# Patient Record
Sex: MALE | Race: WHITE | NOT HISPANIC OR LATINO | URBAN - METROPOLITAN AREA
[De-identification: names, ages, dates, MRNs, and addresses within clinical notes are randomized per-mention and may not be internally consistent; named-entity substitution may affect disease eponyms.]

---

## 2020-01-16 ENCOUNTER — EMERGENCY (EMERGENCY)
Facility: HOSPITAL | Age: 40
LOS: 1 days | Discharge: HOME | End: 2020-01-16
Attending: EMERGENCY MEDICINE | Admitting: EMERGENCY MEDICINE
Payer: OTHER MISCELLANEOUS

## 2020-01-16 VITALS
OXYGEN SATURATION: 98 % | DIASTOLIC BLOOD PRESSURE: 79 MMHG | HEART RATE: 69 BPM | RESPIRATION RATE: 16 BRPM | SYSTOLIC BLOOD PRESSURE: 129 MMHG | HEIGHT: 73 IN | TEMPERATURE: 98 F | WEIGHT: 214.95 LBS

## 2020-01-16 PROCEDURE — 73630 X-RAY EXAM OF FOOT: CPT | Mod: 26,LT

## 2020-01-16 PROCEDURE — 99283 EMERGENCY DEPT VISIT LOW MDM: CPT | Mod: 25

## 2020-01-16 PROCEDURE — 99053 MED SERV 10PM-8AM 24 HR FAC: CPT

## 2020-01-16 PROCEDURE — 73610 X-RAY EXAM OF ANKLE: CPT | Mod: 26,LT

## 2020-01-16 RX ORDER — IBUPROFEN 200 MG
600 TABLET ORAL ONCE
Refills: 0 | Status: COMPLETED | OUTPATIENT
Start: 2020-01-16 | End: 2020-01-16

## 2020-01-16 RX ADMIN — Medication 600 MILLIGRAM(S): at 23:47

## 2020-01-16 NOTE — ED PROVIDER NOTE - DIAGNOSTIC INTERPRETATION
ER Physician: Harry Delgadillo  ANKLE XRAY INTERPRETATION:  no acute fracture; no soft tissue swelling noted; normal bony alignment.   FOOT XRAY INTERPRETATION:  no acute fracture; no soft tissue swelling noted; normal bony alignment.

## 2020-01-16 NOTE — ED PROVIDER NOTE - PHYSICAL EXAMINATION
Physical Exam  GEN: Awake, alert, non-toxic appearing, NCAT  EYES: full EOMI,  ENT: External inspection normal, normal voice,   HEAD: atraumatic  NECK: FROM neck, supple, no meningismus, trachea midline, no JVD  RESP: no tachypnea, no hypoxia, no resp distress,  MSK: tenderness to L lat mal > med mal, tenderness over 4/5th metatarsal and 1st metatarsal, no tenderness to proximal tibfib, full ROM of L knee/ankle  SKIN: pedal pulse intact, cap refill < 2 sec Physical Exam  GEN: Awake, alert, non-toxic appearing, NCAT  EYES: full EOMI,  ENT: External inspection normal, normal voice,   HEAD: atraumatic  NECK: FROM neck, supple,   RESP: no tachypnea, no hypoxia, no resp distress,  MSK: tenderness to L lat mal > med mal, tenderness over 4/5th metatarsal and 1st metatarsal, no tenderness to proximal tibfib, full ROM of L knee/ankle  SKIN: pedal pulse intact, cap refill < 2 sec

## 2020-01-16 NOTE — ED PROVIDER NOTE - OBJECTIVE STATEMENT
40 yom pw ankle/foot pain, stepped into a pothole after getting off from his truck while at work.  pain to ankle/foot.  no other injuries.  worse w/ weight bearing 40 yom pw ankle/foot pain, stepped into a pothole after getting off from his truck while at work.  pain to ankle/foot.  no other injuries.  worse w/ weight bearing.  arrived via ambulance from work.

## 2020-01-16 NOTE — ED PROVIDER NOTE - PATIENT PORTAL LINK FT
You can access the FollowMyHealth Patient Portal offered by [facility] by registering at the following website: [email or web address]. By joining [employer]’s FollowMyHealth portal, you will also be able to view your health information using other applications (apps) compatible with our system.

## 2020-01-16 NOTE — ED PROVIDER NOTE - NSFOLLOWUPINSTRUCTIONS_ED_ALL_ED_FT
Follow up with your primary care doctor   Alternate tylenol/motrin if you do not have any allergies/intolerance   You can take 600mg of motrin every 6 hours with food as needed  You can take 1000mg of tylenol every 6 hours as needed   Ice for swelling as needed (5-10 minutes every hour)  Keep it elevated to reduce swelling   Keep your foot in the boot for comfort  Return immediately for any new or worsening symptoms or any new concerns Follow up with your primary care doctor   You were brought here by an ambulance from work  The xray here did not show any fractures.  Sometimes a small fracture is too subtle to be seen on the initial xray.  You may need a repeat xray in 7-10 days if your pain is not improving, or sooner if pain is worsening.  Alternate tylenol/motrin if you do not have any allergies/intolerance   You can take 600mg of motrin every 6 hours with food as needed  You can take 1000mg of tylenol every 6 hours as needed   Ice for swelling as needed (5-10 minutes every hour)  Keep it elevated to reduce swelling   Keep your foot in the boot for comfort  Return immediately for any new or worsening symptoms or any new concerns

## 2020-01-17 VITALS
RESPIRATION RATE: 18 BRPM | DIASTOLIC BLOOD PRESSURE: 74 MMHG | OXYGEN SATURATION: 96 % | HEART RATE: 64 BPM | TEMPERATURE: 98 F | SYSTOLIC BLOOD PRESSURE: 135 MMHG

## 2020-01-17 NOTE — ED ADULT NURSE NOTE - OBJECTIVE STATEMENT
Pt is a 40y male complaining of left ankle pain. Pt states that he twisted his ankle while at work. Pt able to bear weight.
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
## 2020-01-22 DIAGNOSIS — M79.672 PAIN IN LEFT FOOT: ICD-10-CM
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
## 2020-01-22 DIAGNOSIS — M25.572 PAIN IN LEFT ANKLE AND JOINTS OF LEFT FOOT: ICD-10-CM

## 2024-10-02 PROBLEM — Z00.00 ENCOUNTER FOR PREVENTIVE HEALTH EXAMINATION: Status: ACTIVE | Noted: 2024-10-02

## 2024-10-02 PROBLEM — K44.9 HIATAL HERNIA: Status: ACTIVE | Noted: 2024-10-02

## 2024-10-02 PROBLEM — K22.2 SCHATZKI'S RING: Status: ACTIVE | Noted: 2024-10-02

## 2024-10-03 ENCOUNTER — APPOINTMENT (OUTPATIENT)
Dept: THORACIC SURGERY | Facility: CLINIC | Age: 44
End: 2024-10-03
Payer: COMMERCIAL

## 2024-10-03 DIAGNOSIS — K22.2 ESOPHAGEAL OBSTRUCTION: ICD-10-CM

## 2024-10-03 DIAGNOSIS — Z87.19 PERSONAL HISTORY OF OTHER DISEASES OF THE DIGESTIVE SYSTEM: ICD-10-CM

## 2024-10-03 DIAGNOSIS — K44.9 DIAPHRAGMATIC HERNIA W/OUT OBSTRUCTION OR GANGRENE: ICD-10-CM

## 2024-10-03 PROCEDURE — 99204 OFFICE O/P NEW MOD 45 MIN: CPT

## 2024-10-03 RX ORDER — OMEPRAZOLE 40 MG/1
40 CAPSULE, DELAYED RELEASE ORAL
Refills: 0 | Status: ACTIVE | COMMUNITY

## 2024-10-03 RX ORDER — FAMOTIDINE 40 MG/1
40 TABLET, FILM COATED ORAL
Refills: 0 | Status: ACTIVE | COMMUNITY

## 2024-10-03 NOTE — HISTORY OF PRESENT ILLNESS
[FreeTextEntry1] : Mr. MI CAZARES, 44 year old male, never smoker, w/ hx of chronic GERD, who presented with hiatal hernia w/ GERD, referred by Dr. Dasha Colvin (GI)  EGD on 8/7/24 by Dr. Colvin showed a small H.H., a non-obstructing Schatzki's ring in the lower third of the esophagus. Path c/w mild chronic gastritis, negative for H. Pylori.  Upper GI Series on 9/26/24 at [facility]: - ting sized sliding H.H. - normal esophageal motility  Patient is here today for CT Sx consultation. He reports of epigastric discomfort, denies any acid reflux, dysphagia or food getting stuck.

## 2024-10-03 NOTE — DATA REVIEWED
[FreeTextEntry1] : I have independently reviewed the following: EGD on 8/7/24 by Dr. Colvin Upper GI Series on 9/26/24 at [facility]

## 2024-10-03 NOTE — PHYSICAL EXAM
[Restricted in physically strenuous activity but ambulatory and able to carry out work of a light or sedentary nature] : Status 1- Restricted in physically strenuous activity but ambulatory and able to carry out work of a light or sedentary nature, e.g., light house work, office work [General Appearance - Alert] : alert [General Appearance - In No Acute Distress] : in no acute distress [Sclera] : the sclera and conjunctiva were normal [PERRL With Normal Accommodation] : pupils were equal in size, round, and reactive to light [Outer Ear] : the ears and nose were normal in appearance [Hearing Threshold Finger Rub Not [place]] : hearing was normal [Neck Appearance] : the appearance of the neck was normal [Auscultation Breath Sounds / Voice Sounds] : lungs were clear to auscultation bilaterally [Heart Rate And Rhythm] : heart rate was normal and rhythm regular [Examination Of The Chest] : the chest was normal in appearance [Chest Visual Inspection Thoracic Asymmetry] : no chest asymmetry [Diminished Respiratory Excursion] : normal chest expansion [2+] : left 2+ [Bowel Sounds] : normal bowel sounds [Abdomen Soft] : soft [Cervical Lymph Nodes Enlarged Posterior Bilaterally] : posterior cervical [Cervical Lymph Nodes Enlarged Anterior Bilaterally] : anterior cervical [No CVA Tenderness] : no ~M costovertebral angle tenderness [No Spinal Tenderness] : no spinal tenderness [Abnormal Walk] : normal gait [Nail Clubbing] : no clubbing  or cyanosis of the fingernails [Musculoskeletal - Swelling] : no joint swelling seen [Motor Tone] : muscle strength and tone were normal [Skin Color & Pigmentation] : normal skin color and pigmentation [Skin Turgor] : normal skin turgor [] : no rash [Deep Tendon Reflexes (DTR)] : deep tendon reflexes were 2+ and symmetric [Sensation] : the sensory exam was normal to light touch and pinprick [No Focal Deficits] : no focal deficits [Oriented To Time, Place, And Person] : oriented to person, place, and time [Impaired Insight] : insight and judgment were intact [Affect] : the affect was normal

## 2024-10-03 NOTE — PHYSICAL EXAM
[Restricted in physically strenuous activity but ambulatory and able to carry out work of a light or sedentary nature] : Status 1- Restricted in physically strenuous activity but ambulatory and able to carry out work of a light or sedentary nature, e.g., light house work, office work [General Appearance - Alert] : alert [General Appearance - In No Acute Distress] : in no acute distress [Sclera] : the sclera and conjunctiva were normal [PERRL With Normal Accommodation] : pupils were equal in size, round, and reactive to light [Outer Ear] : the ears and nose were normal in appearance [Hearing Threshold Finger Rub Not [place]] : hearing was normal [Neck Appearance] : the appearance of the neck was normal [Auscultation Breath Sounds / Voice Sounds] : lungs were clear to auscultation bilaterally [Heart Rate And Rhythm] : heart rate was normal and rhythm regular [Examination Of The Chest] : the chest was normal in appearance [Chest Visual Inspection Thoracic Asymmetry] : no chest asymmetry [Diminished Respiratory Excursion] : normal chest expansion [2+] : left 2+ [Bowel Sounds] : normal bowel sounds [Abdomen Soft] : soft [Cervical Lymph Nodes Enlarged Posterior Bilaterally] : posterior cervical [Cervical Lymph Nodes Enlarged Anterior Bilaterally] : anterior cervical [No CVA Tenderness] : no ~M costovertebral angle tenderness [No Spinal Tenderness] : no spinal tenderness [Abnormal Walk] : normal gait [Nail Clubbing] : no clubbing  or cyanosis of the fingernails [Musculoskeletal - Swelling] : no joint swelling seen [Motor Tone] : muscle strength and tone were normal [Skin Turgor] : normal skin turgor [Skin Color & Pigmentation] : normal skin color and pigmentation [] : no rash [Deep Tendon Reflexes (DTR)] : deep tendon reflexes were 2+ and symmetric [Sensation] : the sensory exam was normal to light touch and pinprick [No Focal Deficits] : no focal deficits [Oriented To Time, Place, And Person] : oriented to person, place, and time [Impaired Insight] : insight and judgment were intact [Affect] : the affect was normal

## 2024-10-03 NOTE — PHYSICAL EXAM
[Restricted in physically strenuous activity but ambulatory and able to carry out work of a light or sedentary nature] : Status 1- Restricted in physically strenuous activity but ambulatory and able to carry out work of a light or sedentary nature, e.g., light house work, office work [General Appearance - Alert] : alert [General Appearance - In No Acute Distress] : in no acute distress [Sclera] : the sclera and conjunctiva were normal [PERRL With Normal Accommodation] : pupils were equal in size, round, and reactive to light [Outer Ear] : the ears and nose were normal in appearance [Hearing Threshold Finger Rub Not [place]] : hearing was normal [Neck Appearance] : the appearance of the neck was normal [Auscultation Breath Sounds / Voice Sounds] : lungs were clear to auscultation bilaterally [Heart Rate And Rhythm] : heart rate was normal and rhythm regular [Examination Of The Chest] : the chest was normal in appearance [Chest Visual Inspection Thoracic Asymmetry] : no chest asymmetry [Diminished Respiratory Excursion] : normal chest expansion [2+] : left 2+ [Bowel Sounds] : normal bowel sounds [Abdomen Soft] : soft [Cervical Lymph Nodes Enlarged Posterior Bilaterally] : posterior cervical [Cervical Lymph Nodes Enlarged Anterior Bilaterally] : anterior cervical [No CVA Tenderness] : no ~M costovertebral angle tenderness [No Spinal Tenderness] : no spinal tenderness [Abnormal Walk] : normal gait [Nail Clubbing] : no clubbing  or cyanosis of the fingernails [Musculoskeletal - Swelling] : no joint swelling seen [Motor Tone] : muscle strength and tone were normal [Skin Turgor] : normal skin turgor [Skin Color & Pigmentation] : normal skin color and pigmentation [] : no rash [Deep Tendon Reflexes (DTR)] : deep tendon reflexes were 2+ and symmetric [Sensation] : the sensory exam was normal to light touch and pinprick [No Focal Deficits] : no focal deficits [Oriented To Time, Place, And Person] : oriented to person, place, and time [Impaired Insight] : insight and judgment were intact [Affect] : the affect was normal

## 2024-10-03 NOTE — DATA REVIEWED
[FreeTextEntry1] : I have independently reviewed the following: EGD on 8/7/24 by Dr. Colvin Upper GI Series on 9/26/24 at [facility]

## 2024-10-03 NOTE — DATA REVIEWED
[FreeTextEntry1] : I have independently reviewed the following: EGD on 8/7/24 by Dr. Colvin Upper GI Series on 9/26/24 at [facility]

## 2024-10-03 NOTE — ASSESSMENT
[FreeTextEntry1] : Mr. MI CAZARES, 44 year old male, never smoker, w/ hx of chronic GERD, who presented with hiatal hernia w/ GERD, referred by Dr. Dasha Colvin (GI).  EGD on 8/7/24 by Dr. Colvin showed a small H.H., a non-obstructing Schatzki's ring in the lower third of the esophagus. Path c/w mild chronic gastritis, negative for H. Pylori.  Upper GI Series on 9/26/24 at [facility]: - ting sized sliding H.H. - normal esophageal motility  I have reviewed the patient's medical records and diagnostic images at time of this office consultation and have made the following recommendation: 1. GI studies reviewed, patient has mild chronic gastritis on antacid and hiatal hernia. He is scheduled for a repeat upper EGD with Dr. Colvin. Will do a TTM after EGD to discuss hiatal hernia repair.   I, YELITZA Beard, personally performed the evaluation and management (E/M) services for this established patient who presents today with (a) new problem(s)/exacerbation of (an) existing condition(s). That E/M includes conducting the examination, assessing all new/exacerbated conditions, and establishing a new plan of care. Today, my ACP, JUSTEN Boo, was here to observe my evaluation and management services for this new problem/exacerbated condition to be followed going forward.

## 2024-10-03 NOTE — HISTORY OF PRESENT ILLNESS
[FreeTextEntry1] : Mr. MI CAZARES, 44 year old male, never smoker, w/ hx of chronic GERD, who presented with hiatal hernia w/ GERD, referred by Dr. Dasha Colvin (GI)  EGD on 8/7/24 by Dr. Colvin showed a small H.H., a non-obstructing Schatzki's ring in the lower third of the esophagus. Path c/w mild chronic gastritis, negative for H. Pylori.  Upper GI Series on 9/26/24 at [facility]: - ting sized sliding H.H. - normal esophageal motility  Patient is here today for CT Sx consultation. He reports of epigastric discomfort, denies any acid reflux, dysphagia or food getting stuck.

## 2024-10-03 NOTE — HISTORY OF PRESENT ILLNESS
[FreeTextEntry1] : Mr. MI CAZARES, 44 year old male, never smoker, w/ hx of chronic GERD, who presented with hiatal hernia w/ GERD, referred by Dr. Dasha Colvin (GI)  EGD on 8/7/24 by Dr. Colvin showed a small H.H., a non-obstructing Schatzki's ring in the lower third of the esophagus. Path c/w mild chronic gastritis, negative for H. Pylori.  Upper GI Series on 9/26/24 at [facility]: - ting sized sliding H.H. - normal esophageal motility  Patient is here today for CT Sx consultation. He reports of epigastric discomfort, denies any acid reflux, dysphagia or food getting stuck.

## 2024-10-03 NOTE — CONSULT LETTER
[Dear  ___] : Dear  [unfilled], [Consult Letter:] : I had the pleasure of evaluating your patient, [unfilled]. [( Thank you for referring [unfilled] for consultation for _____ )] : Thank you for referring [unfilled] for consultation for [unfilled] [Please see my note below.] : Please see my note below. [Consult Closing:] : Thank you very much for allowing me to participate in the care of this patient.  If you have any questions, please do not hesitate to contact me. [Sincerely,] : Sincerely, [FreeTextEntry2] : Dr. Dasha Colvin (GI/Referring) [FreeTextEntry3] : Ezequiel Bell MD, MPH  System Director of Thoracic Surgery  Director of Comprehensive Lung and Foregut [place]  Professor Cardiovascular & Thoracic Surgery   [facility] at [facility] [phone] [place] [address] [place] [address] Tel: [phone] Fax: [phone]

## 2024-10-03 NOTE — CONSULT LETTER
[Dear  ___] : Dear  [unfilled], [Consult Letter:] : I had the pleasure of evaluating your patient, [unfilled]. [( Thank you for referring [unfilled] for consultation for _____ )] : Thank you for referring [unfilled] for consultation for [unfilled] [Please see my note below.] : Please see my note below. [Consult Closing:] : Thank you very much for allowing me to participate in the care of this patient.  If you have any questions, please do not hesitate to contact me. [Sincerely,] : Sincerely, [FreeTextEntry2] : Dr. Dasha Colvin (GI/Referring) [FreeTextEntry3] : Ezequiel Bell MD, MPH  System Director of Thoracic Surgery  Director of Comprehensive Lung and Foregut [place]  Professor Cardiovascular & Thoracic Surgery   [facility] at [facility] [phone] [place] [address] [place] [address] Tel: [phone] Fax: [phone]

## 2024-10-03 NOTE — ASSESSMENT
[FreeTextEntry1] : Mr. MI CAZARES, 44 year old male, never smoker, w/ hx of chronic GERD, who presented with hiatal hernia w/ GERD, referred by Dr. Dasha Colvin (GI).  EGD on 8/7/24 by Dr. Colvin showed a small H.H., a non-obstructing Schatzki's ring in the lower third of the esophagus. Path c/w mild chronic gastritis, negative for H. Pylori.  Upper GI Series on 9/26/24 at [facility]: - ting sized sliding H.H. - normal esophageal motility  I have reviewed the patient's medical records and diagnostic images at time of this office consultation and have made the following recommendation: 1. GI studies reviewed, patient has mild chronic gastritis on antacid and hiatal hernia. He is scheduled for a repeat upper EGD with Dr. Colvin. Will do a TTM after EGD to discuss hiatal hernia repair.   I, YELITZA Beard, personally performed the evaluation and management (E/M) services for this established patient who presents today with (a) new problem(s)/exacerbation of (an) existing condition(s). That E/M includes conducting the examination, assessing all new/exacerbated conditions, and establishing a new plan of care. Today, my ACP, JUSTEN Boo, was here to observe my evaluation and management services for this new problem/exacerbated condition to be followed going forward.

## 2024-10-03 NOTE — ASSESSMENT
[FreeTextEntry1] : Mr. MI CAZARES, 44 year old male, never smoker, w/ hx of chronic GERD, who presented with hiatal hernia w/ GERD, referred by Dr. Dasha Colvin (GI).  EGD on 8/7/24 by Dr. Colvin showed a small H.H., a non-obstructing Schatzki's ring in the lower third of the esophagus. Path c/w mild chronic gastritis, negative for H. Pylori.  Upper GI Series on 9/26/24 at [facility]: - ting sized sliding H.H. - normal esophageal motility  I have reviewed the patient's medical records and diagnostic images at time of this office consultation and have made the following recommendation: 1. GI studies reviewed, patient has mild chronic gastritis on antacid and hiatal hernia. He is scheduled for a repeat upper EGD with Dr. Colvin. Will do a TTM after EGD to discuss hiatal hernia repair.   I, YELITZA Beard, personally performed the evaluation and management (E/M) services for this established patient who presents today with (a) new problem(s)/exacerbation of (an) existing condition(s). That E/M includes conducting the examination, assessing all new/exacerbated conditions, and establishing a new plan of care. Today, my ACP, JUSTEN Boo, was here to observe my evaluation and management services for this new problem/exacerbated condition to be followed going forward.

## 2024-10-03 NOTE — CONSULT LETTER
[Dear  ___] : Dear  [unfilled], [Consult Letter:] : I had the pleasure of evaluating your patient, [unfilled]. [( Thank you for referring [unfilled] for consultation for _____ )] : Thank you for referring [unfilled] for consultation for [unfilled] [Please see my note below.] : Please see my note below. [Consult Closing:] : Thank you very much for allowing me to participate in the care of this patient.  If you have any questions, please do not hesitate to contact me. [Sincerely,] : Sincerely, [FreeTextEntry2] : Dr. Dasha Colvin (GI/Referring) [FreeTextEntry3] : Ezequiel Bell MD, MPH  System Director of Thoracic Surgery  Director of Comprehensive Lung and Foregut [place]  Professor Cardiovascular & Thoracic Surgery   [facility] at [facility] [phone] [place] [address] [place] [address] Tel: [phone] Fax: [phone]

## 2024-12-24 ENCOUNTER — APPOINTMENT (OUTPATIENT)
Dept: INTERNAL MEDICINE | Facility: CLINIC | Age: 44
End: 2024-12-24